# Patient Record
Sex: FEMALE | Race: WHITE
[De-identification: names, ages, dates, MRNs, and addresses within clinical notes are randomized per-mention and may not be internally consistent; named-entity substitution may affect disease eponyms.]

---

## 2020-09-30 PROBLEM — Z00.00 ENCOUNTER FOR PREVENTIVE HEALTH EXAMINATION: Status: ACTIVE | Noted: 2020-09-30

## 2020-10-21 ENCOUNTER — APPOINTMENT (OUTPATIENT)
Dept: SURGERY | Facility: CLINIC | Age: 55
End: 2020-10-21
Payer: COMMERCIAL

## 2020-10-21 VITALS
TEMPERATURE: 97.8 F | WEIGHT: 182 LBS | SYSTOLIC BLOOD PRESSURE: 120 MMHG | DIASTOLIC BLOOD PRESSURE: 80 MMHG | HEIGHT: 62 IN | BODY MASS INDEX: 33.49 KG/M2 | HEART RATE: 93 BPM

## 2020-10-21 DIAGNOSIS — F17.200 NICOTINE DEPENDENCE, UNSPECIFIED, UNCOMPLICATED: ICD-10-CM

## 2020-10-21 DIAGNOSIS — Z80.3 FAMILY HISTORY OF MALIGNANT NEOPLASM OF BREAST: ICD-10-CM

## 2020-10-21 DIAGNOSIS — Z82.3 FAMILY HISTORY OF STROKE: ICD-10-CM

## 2020-10-21 DIAGNOSIS — Z82.49 FAMILY HISTORY OF ISCHEMIC HEART DISEASE AND OTHER DISEASES OF THE CIRCULATORY SYSTEM: ICD-10-CM

## 2020-10-21 PROCEDURE — 99204 OFFICE O/P NEW MOD 45 MIN: CPT

## 2020-10-21 PROCEDURE — 99072 ADDL SUPL MATRL&STAF TM PHE: CPT

## 2020-10-23 PROBLEM — Z82.3 FAMILY HISTORY OF CEREBROVASCULAR ACCIDENT (CVA): Status: ACTIVE | Noted: 2020-10-21

## 2020-10-23 PROBLEM — Z80.3 FAMILY HISTORY OF MALIGNANT NEOPLASM OF BREAST: Status: ACTIVE | Noted: 2020-10-21

## 2020-10-23 PROBLEM — Z82.49 FAMILY HISTORY OF CARDIAC DISORDER: Status: ACTIVE | Noted: 2020-10-21

## 2020-10-23 PROBLEM — F17.200 CURRENT EVERY DAY SMOKER: Status: ACTIVE | Noted: 2020-10-21

## 2020-10-23 NOTE — PHYSICAL EXAM
[Abdomen Masses] : No abdominal masses [Abdomen Tenderness] : ~T No ~M abdominal tenderness [No HSM] : no hepatosplenomegaly [Excoriation] : no perianal excoriation [Fistula] : no fistulas [Wart] : no warts [Ulcer ___ cm] : no ulcers [Pilonidal Cyst] : no pilonidal cysts [Nonprolapsing] : a nonprolapsing (grade I) [Tender, Swollen] : nontender, non-swollen [Thrombosed] : that was not thrombosed [Skin Tags] : there were no residual hemorrhoidal skin tags seen [Normal] : was normal [None] : there was no rectal mass  [Respiratory Effort] : normal respiratory effort [Normal Rate and Rhythm] : normal rate and rhythm [de-identified] : external examination shows no significant abnormalities [de-identified] : awake, alert and in no acute distress

## 2020-10-23 NOTE — HISTORY OF PRESENT ILLNESS
[FreeTextEntry1] : Patient is a 55F with no PMH who presents for evaluation of colon polyps.  Patient states that she believes she has a polyp in her distal rectum.  She denies any symptoms including pain, bleeding, pressure or prolapse.  She is tolerating a diet and having 1-2 soft BM daily.  Patient denies fevers, chills, nausea, vomiting, abdominal pain, constipation, diarrhea, blood in her stool or unexpected weight loss.  Patient has a family history of colon polyps in her father. Patient has never had a colonoscopy.\par

## 2020-10-23 NOTE — ASSESSMENT
[FreeTextEntry1] : 55F for screening colonoscopy\par \par I discussed with the patient that her exam today did not reveal a polyp.  I recommended colonoscopy given her age.  All risks benefits and alternatives were discussed including incomplete colonoscopy, missed lesion, bleeding, infection and perforation requiring laparotomy.  The patient expressed understanding and was in agreement with the plan.\par

## 2020-10-23 NOTE — PROCEDURE
[FreeTextEntry1] : YOHANA and anoscopy show normal sphincter tone, normal internal hemorrhoids and no masses.  No polypoid tissue was identified on exam.\par

## 2020-11-09 ENCOUNTER — LABORATORY RESULT (OUTPATIENT)
Age: 55
End: 2020-11-09

## 2020-11-10 ENCOUNTER — APPOINTMENT (OUTPATIENT)
Dept: OBGYN | Facility: CLINIC | Age: 55
End: 2020-11-10
Payer: COMMERCIAL

## 2020-11-10 VITALS
SYSTOLIC BLOOD PRESSURE: 122 MMHG | TEMPERATURE: 98.2 F | WEIGHT: 185 LBS | DIASTOLIC BLOOD PRESSURE: 80 MMHG | BODY MASS INDEX: 33.84 KG/M2

## 2020-11-10 PROCEDURE — 99386 PREV VISIT NEW AGE 40-64: CPT

## 2020-11-10 PROCEDURE — 99072 ADDL SUPL MATRL&STAF TM PHE: CPT

## 2020-11-10 NOTE — HISTORY OF PRESENT ILLNESS
[FreeTextEntry1] : Patient is 55 years old para 5-0-0-5 last menstrual period 2003\par She denies postmenopausal bleeding\par Patient was noted to have an endometrial polyp 9 x 7 x 3 mm on pelvic ultrasound of January 15, 2020\par Recommendation was for sonohysterogram at that time\par

## 2020-11-10 NOTE — DISCUSSION/SUMMARY
[FreeTextEntry1] : Pap done\par Self breast exam stressed\par Prescribed bilateral screening mammogram\par Prescribed sonohysterogram\par Advised patient that if polyp is identified on sonohysterogram a hysteroscopy D&C with MyoSure is recommended

## 2020-11-12 ENCOUNTER — NON-APPOINTMENT (OUTPATIENT)
Age: 55
End: 2020-11-12

## 2020-12-30 ENCOUNTER — RESULT CHARGE (OUTPATIENT)
Age: 55
End: 2020-12-30

## 2020-12-30 ENCOUNTER — ASOB RESULT (OUTPATIENT)
Age: 55
End: 2020-12-30

## 2020-12-30 ENCOUNTER — OUTPATIENT (OUTPATIENT)
Dept: OUTPATIENT SERVICES | Facility: HOSPITAL | Age: 55
LOS: 1 days | Discharge: HOME | End: 2020-12-30

## 2020-12-30 ENCOUNTER — APPOINTMENT (OUTPATIENT)
Dept: ANTEPARTUM | Facility: CLINIC | Age: 55
End: 2020-12-30
Payer: COMMERCIAL

## 2020-12-30 VITALS
DIASTOLIC BLOOD PRESSURE: 90 MMHG | WEIGHT: 182 LBS | HEIGHT: 62 IN | SYSTOLIC BLOOD PRESSURE: 139 MMHG | BODY MASS INDEX: 33.49 KG/M2

## 2020-12-30 LAB
HCG UR QL: NEGATIVE
QUALITY CONTROL: YES

## 2020-12-30 PROCEDURE — 76831 ECHO EXAM UTERUS: CPT | Mod: 26

## 2020-12-30 PROCEDURE — 58340 CATHETER FOR HYSTEROGRAPHY: CPT

## 2021-04-09 ENCOUNTER — OUTPATIENT (OUTPATIENT)
Dept: OUTPATIENT SERVICES | Facility: HOSPITAL | Age: 56
LOS: 1 days | Discharge: HOME | End: 2021-04-09
Payer: COMMERCIAL

## 2021-04-09 VITALS
WEIGHT: 178.57 LBS | DIASTOLIC BLOOD PRESSURE: 84 MMHG | SYSTOLIC BLOOD PRESSURE: 132 MMHG | HEART RATE: 62 BPM | TEMPERATURE: 97 F | HEIGHT: 62 IN | RESPIRATION RATE: 16 BRPM | OXYGEN SATURATION: 99 %

## 2021-04-09 DIAGNOSIS — N84.0 POLYP OF CORPUS UTERI: ICD-10-CM

## 2021-04-09 DIAGNOSIS — Z01.818 ENCOUNTER FOR OTHER PREPROCEDURAL EXAMINATION: ICD-10-CM

## 2021-04-09 LAB
ALBUMIN SERPL ELPH-MCNC: 4.9 G/DL — SIGNIFICANT CHANGE UP (ref 3.5–5.2)
ALP SERPL-CCNC: 77 U/L — SIGNIFICANT CHANGE UP (ref 30–115)
ALT FLD-CCNC: 9 U/L — SIGNIFICANT CHANGE UP (ref 0–41)
ANION GAP SERPL CALC-SCNC: 11 MMOL/L — SIGNIFICANT CHANGE UP (ref 7–14)
AST SERPL-CCNC: 14 U/L — SIGNIFICANT CHANGE UP (ref 0–41)
BASOPHILS # BLD AUTO: 0.03 K/UL — SIGNIFICANT CHANGE UP (ref 0–0.2)
BASOPHILS NFR BLD AUTO: 0.4 % — SIGNIFICANT CHANGE UP (ref 0–1)
BILIRUB SERPL-MCNC: 0.2 MG/DL — SIGNIFICANT CHANGE UP (ref 0.2–1.2)
BUN SERPL-MCNC: 11 MG/DL — SIGNIFICANT CHANGE UP (ref 10–20)
CALCIUM SERPL-MCNC: 10.4 MG/DL — HIGH (ref 8.5–10.1)
CHLORIDE SERPL-SCNC: 101 MMOL/L — SIGNIFICANT CHANGE UP (ref 98–110)
CO2 SERPL-SCNC: 28 MMOL/L — SIGNIFICANT CHANGE UP (ref 17–32)
CREAT SERPL-MCNC: 0.7 MG/DL — SIGNIFICANT CHANGE UP (ref 0.7–1.5)
EOSINOPHIL # BLD AUTO: 0.15 K/UL — SIGNIFICANT CHANGE UP (ref 0–0.7)
EOSINOPHIL NFR BLD AUTO: 1.8 % — SIGNIFICANT CHANGE UP (ref 0–8)
GLUCOSE SERPL-MCNC: 59 MG/DL — LOW (ref 70–99)
HCT VFR BLD CALC: 44.9 % — SIGNIFICANT CHANGE UP (ref 37–47)
HGB BLD-MCNC: 14.6 G/DL — SIGNIFICANT CHANGE UP (ref 12–16)
IMM GRANULOCYTES NFR BLD AUTO: 0.2 % — SIGNIFICANT CHANGE UP (ref 0.1–0.3)
LYMPHOCYTES # BLD AUTO: 2.66 K/UL — SIGNIFICANT CHANGE UP (ref 1.2–3.4)
LYMPHOCYTES # BLD AUTO: 32 % — SIGNIFICANT CHANGE UP (ref 20.5–51.1)
MCHC RBC-ENTMCNC: 30.2 PG — SIGNIFICANT CHANGE UP (ref 27–31)
MCHC RBC-ENTMCNC: 32.5 G/DL — SIGNIFICANT CHANGE UP (ref 32–37)
MCV RBC AUTO: 92.8 FL — SIGNIFICANT CHANGE UP (ref 81–99)
MONOCYTES # BLD AUTO: 0.5 K/UL — SIGNIFICANT CHANGE UP (ref 0.1–0.6)
MONOCYTES NFR BLD AUTO: 6 % — SIGNIFICANT CHANGE UP (ref 1.7–9.3)
NEUTROPHILS # BLD AUTO: 4.95 K/UL — SIGNIFICANT CHANGE UP (ref 1.4–6.5)
NEUTROPHILS NFR BLD AUTO: 59.6 % — SIGNIFICANT CHANGE UP (ref 42.2–75.2)
NRBC # BLD: 0 /100 WBCS — SIGNIFICANT CHANGE UP (ref 0–0)
PLATELET # BLD AUTO: 359 K/UL — SIGNIFICANT CHANGE UP (ref 130–400)
POTASSIUM SERPL-MCNC: 4.8 MMOL/L — SIGNIFICANT CHANGE UP (ref 3.5–5)
POTASSIUM SERPL-SCNC: 4.8 MMOL/L — SIGNIFICANT CHANGE UP (ref 3.5–5)
PROT SERPL-MCNC: 7.7 G/DL — SIGNIFICANT CHANGE UP (ref 6–8)
RBC # BLD: 4.84 M/UL — SIGNIFICANT CHANGE UP (ref 4.2–5.4)
RBC # FLD: 12.4 % — SIGNIFICANT CHANGE UP (ref 11.5–14.5)
SODIUM SERPL-SCNC: 140 MMOL/L — SIGNIFICANT CHANGE UP (ref 135–146)
WBC # BLD: 8.31 K/UL — SIGNIFICANT CHANGE UP (ref 4.8–10.8)
WBC # FLD AUTO: 8.31 K/UL — SIGNIFICANT CHANGE UP (ref 4.8–10.8)

## 2021-04-09 PROCEDURE — 93010 ELECTROCARDIOGRAM REPORT: CPT

## 2021-04-09 NOTE — H&P PST ADULT - HISTORY OF PRESENT ILLNESS
54 yo female presents with diagnosis endometrial polyp noted on routine sono "i went through early menopause& dr wants it out"; pt is scheduled for hysteroscopy D&C, myosure. pt is asymptomatic.  denies chest pain, palpitations, shortness of breath, dyspnea, or dysuria. exercise tolerance: "8 miles" w/o sob   pt denies any known exposure to COVID-19, denies any S&S  pt instructed re: self quarantine guidelines    Anesthesia Alert  NO--Difficult Airway  NO--History of neck surgery or radiation  NO--Limited ROM of neck  NO--History of Malignant hyperthermia  NO--No personal or family history of Pseudocholinesterase deficiency.  NO--Prior Anesthesia Complication  NO--Latex Allergy  NO--Loose teeth  NO--History of Rheumatoid Arthritis  NO--ANGEL  NO--Other_____

## 2021-04-09 NOTE — H&P PST ADULT - IS PATIENT PREGNANT?
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  Details of initial diagnosis are not available at this time, again requested to be forwarded by mother.  He is overdue for bloodwork which we have ordered at this time.  Dose will be adjusted as necessary.  Once we receive records from hospital at time of diagnosis, if found to have been only mildly hypothyroid there may be room to trial off medication.  Prior outpatient records have been reviewed and thyroid ultrasound was normal.
menopausal

## 2021-04-22 PROBLEM — N84.0 POLYP OF CORPUS UTERI: Chronic | Status: ACTIVE | Noted: 2021-04-09

## 2021-04-27 ENCOUNTER — OUTPATIENT (OUTPATIENT)
Dept: OUTPATIENT SERVICES | Facility: HOSPITAL | Age: 56
LOS: 1 days | Discharge: HOME | End: 2021-04-27

## 2021-04-27 ENCOUNTER — LABORATORY RESULT (OUTPATIENT)
Age: 56
End: 2021-04-27

## 2021-04-27 DIAGNOSIS — Z11.59 ENCOUNTER FOR SCREENING FOR OTHER VIRAL DISEASES: ICD-10-CM

## 2021-04-30 ENCOUNTER — APPOINTMENT (OUTPATIENT)
Dept: OBGYN | Facility: AMBULATORY SURGERY CENTER | Age: 56
End: 2021-04-30

## 2021-04-30 ENCOUNTER — OUTPATIENT (OUTPATIENT)
Dept: OUTPATIENT SERVICES | Facility: HOSPITAL | Age: 56
LOS: 1 days | Discharge: HOME | End: 2021-04-30
Payer: COMMERCIAL

## 2021-04-30 ENCOUNTER — RESULT REVIEW (OUTPATIENT)
Age: 56
End: 2021-04-30

## 2021-04-30 VITALS
SYSTOLIC BLOOD PRESSURE: 129 MMHG | OXYGEN SATURATION: 98 % | DIASTOLIC BLOOD PRESSURE: 76 MMHG | HEART RATE: 72 BPM | RESPIRATION RATE: 18 BRPM

## 2021-04-30 VITALS
HEIGHT: 62 IN | WEIGHT: 178.57 LBS | HEART RATE: 82 BPM | DIASTOLIC BLOOD PRESSURE: 76 MMHG | SYSTOLIC BLOOD PRESSURE: 131 MMHG | OXYGEN SATURATION: 100 % | RESPIRATION RATE: 18 BRPM

## 2021-04-30 PROCEDURE — 88305 TISSUE EXAM BY PATHOLOGIST: CPT | Mod: 26

## 2021-04-30 PROCEDURE — 58558 HYSTEROSCOPY BIOPSY: CPT

## 2021-04-30 RX ORDER — HYDROMORPHONE HYDROCHLORIDE 2 MG/ML
0.5 INJECTION INTRAMUSCULAR; INTRAVENOUS; SUBCUTANEOUS
Refills: 0 | Status: DISCONTINUED | OUTPATIENT
Start: 2021-04-30 | End: 2021-04-30

## 2021-04-30 RX ORDER — ROBINUL 0.2 MG/ML
0.4 INJECTION INTRAMUSCULAR; INTRAVENOUS ONCE
Refills: 0 | Status: COMPLETED | OUTPATIENT
Start: 2021-04-30 | End: 2021-04-30

## 2021-04-30 RX ORDER — SODIUM CHLORIDE 9 MG/ML
1000 INJECTION, SOLUTION INTRAVENOUS
Refills: 0 | Status: DISCONTINUED | OUTPATIENT
Start: 2021-04-30 | End: 2021-05-14

## 2021-04-30 RX ORDER — ONDANSETRON 8 MG/1
4 TABLET, FILM COATED ORAL ONCE
Refills: 0 | Status: DISCONTINUED | OUTPATIENT
Start: 2021-04-30 | End: 2021-05-14

## 2021-04-30 RX ADMIN — SODIUM CHLORIDE 75 MILLILITER(S): 9 INJECTION, SOLUTION INTRAVENOUS at 09:30

## 2021-04-30 RX ADMIN — ROBINUL 0.4 MILLIGRAM(S): 0.2 INJECTION INTRAMUSCULAR; INTRAVENOUS at 10:08

## 2021-04-30 NOTE — ASU DISCHARGE PLAN (ADULT/PEDIATRIC) - CARE PROVIDER_API CALL
Luke Candelario)  Obstetrics and Gynecology  [address] [place] [address]  Phone: [phone]  Fax: [phone]  Established Patient  Follow Up Time: 2 weeks

## 2021-04-30 NOTE — CHART NOTE - NSCHARTNOTEFT_GEN_A_CORE
PACU ANESTHESIA ADMISSION NOTE      Procedure: Polypectomy, uterus, hysteroscopic    Dilation and curettage, uterus      Post op diagnosis:  Uterine polyp        ____  Intubated  TV:______       Rate: ______      FiO2: ______    __x__  Patent Airway    __x__  Full return of protective reflexes    __x__  Full recovery from anesthesia / back to baseline     Vitals:   T:  97.6         R:  18                BP:   140/70               Sat:   99%                P: 77      Mental Status:  __x__ Awake   __x___ Alert   _____ Drowsy   _____ Sedated    Nausea/Vomiting:  __x__ NO  ______Yes,   See Post - Op Orders          Pain Scale (0-10):  __0___    Treatment: ____ None    ___x_ See Post - Op/PCA Orders    Post - Operative Fluids:   ____ Oral   __x__ See Post - Op Orders    Plan: Discharge:   __x__Home       _____Floor     _____Critical Care    _____  Other:_________________    Comments:  pt tolerated procedure well, pt transferred to PACU and report was given to PACU RN, vital signs are stable and pt shows no signs of distress. no anesthesia complications, discharge pt when criteria met.

## 2021-04-30 NOTE — BRIEF OPERATIVE NOTE - NSICDXBRIEFPROCEDURE_GEN_ALL_CORE_FT
PROCEDURES:  Polypectomy, uterus, hysteroscopic 30-Apr-2021 09:23:11  Socorro Alejandro  Dilation and curettage, uterus 30-Apr-2021 09:23:21  Socorro Alejandro

## 2021-04-30 NOTE — BRIEF OPERATIVE NOTE - OPERATION/FINDINGS
anteverted, non-enlarged, mobile uterus; no adnexal masses  hysteroscopy: 9mm polyp near the right fallopian tube ostia, bilateral ostia visualized, remaining uterine cavity appeared atrophic in appearance anteverted, non-enlarged, mobile uterus; no adnexal masses  hysteroscopy: 9mm polyp fundal 1 o'clock, bilateral ostia visualized, remaining uterine cavity appeared atrophic in appearance. Curettage was productive of a scant amount of tissue.

## 2021-05-03 LAB — SURGICAL PATHOLOGY STUDY: SIGNIFICANT CHANGE UP

## 2021-05-06 ENCOUNTER — NON-APPOINTMENT (OUTPATIENT)
Age: 56
End: 2021-05-06

## 2021-05-06 DIAGNOSIS — N72 INFLAMMATORY DISEASE OF CERVIX UTERI: ICD-10-CM

## 2021-05-06 RX ORDER — DOXYCYCLINE HYCLATE 100 MG/1
100 TABLET ORAL
Qty: 14 | Refills: 0 | Status: COMPLETED | COMMUNITY
Start: 2021-05-06 | End: 2021-05-13

## 2021-05-08 DIAGNOSIS — N93.9 ABNORMAL UTERINE AND VAGINAL BLEEDING, UNSPECIFIED: ICD-10-CM

## 2021-05-08 DIAGNOSIS — F17.210 NICOTINE DEPENDENCE, CIGARETTES, UNCOMPLICATED: ICD-10-CM

## 2021-05-08 DIAGNOSIS — N84.0 POLYP OF CORPUS UTERI: ICD-10-CM

## 2021-05-08 DIAGNOSIS — Z88.8 ALLERGY STATUS TO OTHER DRUGS, MEDICAMENTS AND BIOLOGICAL SUBSTANCES: ICD-10-CM

## 2021-05-08 DIAGNOSIS — N72 INFLAMMATORY DISEASE OF CERVIX UTERI: ICD-10-CM

## 2021-10-05 ENCOUNTER — TRANSCRIPTION ENCOUNTER (OUTPATIENT)
Age: 56
End: 2021-10-05

## 2022-03-03 NOTE — REASON FOR VISIT
The following findings require follow up:  Radiographic finding   Finding: MRI brain: Severe left sphenoid sinus disease with inspissated material   Follow up required: ENT and family medicine   Follow up should be done within 1 week(s) by family medicine  And 2-4 weeks for ENT    Please notify the following clinician to assist with the follow up:   Dr Bradley Mcgrath ENT and Dr Mati Viera [Initial] : an initial consultation for

## 2022-09-28 ENCOUNTER — LABORATORY RESULT (OUTPATIENT)
Age: 57
End: 2022-09-28

## 2022-09-29 ENCOUNTER — APPOINTMENT (OUTPATIENT)
Dept: OBGYN | Facility: CLINIC | Age: 57
End: 2022-09-29

## 2022-09-29 VITALS — WEIGHT: 164 LBS | BODY MASS INDEX: 30 KG/M2 | SYSTOLIC BLOOD PRESSURE: 114 MMHG | DIASTOLIC BLOOD PRESSURE: 62 MMHG

## 2022-09-29 DIAGNOSIS — Z01.419 ENCOUNTER FOR GYNECOLOGICAL EXAMINATION (GENERAL) (ROUTINE) W/OUT ABNORMAL FINDINGS: ICD-10-CM

## 2022-09-29 PROCEDURE — 99396 PREV VISIT EST AGE 40-64: CPT

## 2022-09-29 NOTE — HISTORY OF PRESENT ILLNESS
[FreeTextEntry1] : Patient is 57 years old para 5-0-0-5 last menstrual period 2003.\par She denies postmenopausal bleeding and is presently without complaints.

## 2022-11-15 ENCOUNTER — APPOINTMENT (OUTPATIENT)
Dept: SURGERY | Facility: CLINIC | Age: 57
End: 2022-11-15

## 2022-11-15 VITALS
DIASTOLIC BLOOD PRESSURE: 80 MMHG | TEMPERATURE: 97.5 F | HEART RATE: 100 BPM | BODY MASS INDEX: 28.52 KG/M2 | SYSTOLIC BLOOD PRESSURE: 140 MMHG | WEIGHT: 155 LBS | HEIGHT: 62 IN | OXYGEN SATURATION: 97 %

## 2022-11-15 DIAGNOSIS — Z12.11 ENCOUNTER FOR SCREENING FOR MALIGNANT NEOPLASM OF COLON: ICD-10-CM

## 2022-11-15 PROCEDURE — 99214 OFFICE O/P EST MOD 30 MIN: CPT

## 2022-11-16 PROBLEM — Z12.11 ENCOUNTER FOR SCREENING COLONOSCOPY: Status: ACTIVE | Noted: 2020-10-23

## 2022-11-16 NOTE — PHYSICAL EXAM
[Abdomen Masses] : No abdominal masses [Abdomen Tenderness] : ~T No ~M abdominal tenderness [No HSM] : no hepatosplenomegaly [Excoriation] : no perianal excoriation [Fistula] : no fistulas [Wart] : no warts [Ulcer ___ cm] : no ulcers [Pilonidal Cyst] : no pilonidal cysts [Nonprolapsing] : a nonprolapsing (grade I) [Tender, Swollen] : nontender, non-swollen [Thrombosed] : that was not thrombosed [Skin Tags] : there were no residual hemorrhoidal skin tags seen [Normal] : was normal [None] : there was no rectal mass  [Respiratory Effort] : normal respiratory effort [Normal Rate and Rhythm] : normal rate and rhythm [de-identified] : external examination shows no significant abnormalities [de-identified] : awake, alert and in no acute distress

## 2022-11-16 NOTE — HISTORY OF PRESENT ILLNESS
[FreeTextEntry1] : Patient is a 57F with no PMH who presents to discuss screening colonoscopy  Patient states that she believes she has a polyp in her distal rectum.  She denies any symptoms including pain, bleeding, pressure or prolapse.  She is tolerating a diet and having 1-2 soft BM daily.  Patient denies fevers, chills, nausea, vomiting, abdominal pain, constipation, diarrhea, blood in her stool or unexpected weight loss.  Patient has a family history of colon polyps in her father. Patient has never had a colonoscopy.\par

## 2022-11-16 NOTE — ASSESSMENT
[FreeTextEntry1] : 57F for screening colonoscopy\par \par Given the patient's age and this concern for a polyp, I recommended colonoscopy. All risks benefits and alternatives were discussed including incomplete colonoscopy, missed lesion, bleeding, infection and perforation requiring laparotomy. The patient expressed understanding and was in agreement with the plan.

## 2022-11-17 ENCOUNTER — NON-APPOINTMENT (OUTPATIENT)
Age: 57
End: 2022-11-17

## 2022-11-29 ENCOUNTER — APPOINTMENT (OUTPATIENT)
Dept: BREAST CENTER | Facility: CLINIC | Age: 57
End: 2022-11-29

## 2022-11-29 VITALS
BODY MASS INDEX: 28.52 KG/M2 | DIASTOLIC BLOOD PRESSURE: 62 MMHG | WEIGHT: 155 LBS | SYSTOLIC BLOOD PRESSURE: 142 MMHG | HEIGHT: 62 IN

## 2022-11-29 PROCEDURE — 99203 OFFICE O/P NEW LOW 30 MIN: CPT

## 2022-11-29 NOTE — ASSESSMENT
[FreeTextEntry1] : Patient is a 57F who presents with BIRADS 3 imaging.\par \par Her mother had breast cancer at 30. She also states she has multiple other cancers in her family but not sure which ones. She was offered genetic counseling but does not want it.\par \par She underwent bilateral screening mmg on 10/2022 which showed a right breast nodule (BIRADS 0). She subsequently underwent a right MMG/US which showed the right breast nodule on mmg. Finding was not seen on US (BIRADS 3). She was recommended a repeat mammogram in 6 months.\par \par I had a lengthy discussion with this patient regarding diagnostic results, impressions, recommendations, risks and benefits.\par \par I explained to the patient the BIRADS system of grading and that her findings fall into category 3. Category 3 carries a less than 5% risk of malignancy. Given her unremarkable PE, she can choose to observe the lesion and obtain a MMG as a 6 month follow up. She is also aware that she can also choose to biopsy the lesion if she wishes to. I briefly discussed the procedure will be performed by a breast imaging specialist, and will include numbing with local anesthesia, followed by a small biopsy marker placement afterwards, which is usually not bothersome, and is not recognized by radiofrequency devices.\par \par She understands her options and wants to proceed with observation at this time.\par \par We also discussed obtaining an MRI. The use of MRIs have not been shown to prolong survival, however out of 1000 women screened, an additional 14-15 cancers will be identified. The use of MRIs, has, however, been shown to increase the number of procedures and additional imaging because although it is a very sensitive test, it is not as specific. In addition, the gadolinium dye used in MRIs has later been noted in the brain, although its effects are not currently known. Patient would like to take some time to decide whether she wants to do the MRI or not.\par \par Patient for right MMG in 5/2023.\par \par Patient to follow up after imaging, pending any interval changes.\par \par Patient knows to call if she decides to proceed with a biopsy or with MRI.\par \par Total time spent on encounter was greater than 30 minutes, which included face to face time with the patient, performing an exam, reviewing previous medical records including imaging/ pathology, documenting in patient record and coordinating care/imaging. Greater than 50% of the encounter was spent on counseling and coordination of her breast issue.

## 2022-11-29 NOTE — PHYSICAL EXAM
[Normocephalic] : normocephalic [EOMI] : extra ocular movement intact [No Supraclavicular Adenopathy] : no supraclavicular adenopathy [No Cervical Adenopathy] : no cervical adenopathy [Examined in the supine and seated position] : examined in the supine and seated position [No dominant masses] : no dominant masses in right breast  [No dominant masses] : no dominant masses left breast [No Nipple Retraction] : no left nipple retraction [No Nipple Discharge] : no left nipple discharge [Breast Mass Right Breast ___cm] : no masses [Breast Mass Left Breast ___cm] : no masses [No Axillary Lymphadenopathy] : no left axillary lymphadenopathy [Soft] : abdomen soft [No Rashes] : no rashes [No Ulceration] : no ulceration [Breast Nipple Inversion] : nipples not inverted [Breast Nipple Retraction] : nipples not retracted [Breast Nipple Flattening] : nipples not flattened [Breast Nipple Fissures] : nipples not fissured [de-identified] : NL respirations

## 2022-11-29 NOTE — PAST MEDICAL HISTORY
[Postmenopausal] : The patient is postmenopausal [Menarche Age ____] : age at menarche was [unfilled] [Total Preg ___] : G[unfilled] [Live Births ___] : P[unfilled]  [Age At Live Birth ___] : Age at live birth: [unfilled] [FreeTextEntry3] : 2003 [FreeTextEntry6] : Denies [FreeTextEntry7] : 6 months [FreeTextEntry8] : 2 months

## 2022-11-29 NOTE — HISTORY OF PRESENT ILLNESS
[FreeTextEntry1] : Patient is a 57F with BIRADS 3 imaging\par \par FHx: Denies\par \par \par Her imaging is as follows:\par 3/1/21: B scg MMG --> BIRADS 2\par Scattered density\par \par 10/11/22: B scg MMG --> BIRADS 0\par Scattered density\par Right breast nodule\par \par 11/9/22: R Dx MMG and R US --> BIRADS 3\par Scattered density\par Nodule in right breast. Finding not seen on US\par Nodule probably benign\par Recommend R mmg in 6 months

## 2022-12-01 ENCOUNTER — NON-APPOINTMENT (OUTPATIENT)
Age: 57
End: 2022-12-01

## 2022-12-09 ENCOUNTER — LABORATORY RESULT (OUTPATIENT)
Age: 57
End: 2022-12-09

## 2022-12-12 ENCOUNTER — APPOINTMENT (OUTPATIENT)
Dept: SURGERY | Facility: HOSPITAL | Age: 57
End: 2022-12-12

## 2022-12-12 ENCOUNTER — TRANSCRIPTION ENCOUNTER (OUTPATIENT)
Age: 57
End: 2022-12-12

## 2022-12-12 ENCOUNTER — OUTPATIENT (OUTPATIENT)
Dept: OUTPATIENT SERVICES | Facility: HOSPITAL | Age: 57
LOS: 1 days | Discharge: HOME | End: 2022-12-12

## 2022-12-12 ENCOUNTER — RESULT REVIEW (OUTPATIENT)
Age: 57
End: 2022-12-12

## 2022-12-12 VITALS
RESPIRATION RATE: 18 BRPM | HEART RATE: 79 BPM | DIASTOLIC BLOOD PRESSURE: 63 MMHG | SYSTOLIC BLOOD PRESSURE: 136 MMHG | OXYGEN SATURATION: 99 %

## 2022-12-12 VITALS — HEIGHT: 62 IN | WEIGHT: 149.91 LBS

## 2022-12-12 PROCEDURE — 45385 COLONOSCOPY W/LESION REMOVAL: CPT

## 2022-12-12 PROCEDURE — 88305 TISSUE EXAM BY PATHOLOGIST: CPT | Mod: 26

## 2022-12-12 NOTE — ASU PATIENT PROFILE, ADULT - FALL HARM RISK - UNIVERSAL INTERVENTIONS
Bed in lowest position, wheels locked, appropriate side rails in place/Call bell, personal items and telephone in reach/Instruct patient to call for assistance before getting out of bed or chair/Non-slip footwear when patient is out of bed/[place] to call system/Physically safe environment - no spills, clutter or unnecessary equipment/Purposeful Proactive Rounding/Room/bathroom lighting operational, light cord in reach

## 2022-12-12 NOTE — ASU DISCHARGE PLAN (ADULT/PEDIATRIC) - NS MD DC FALL RISK RISK
For information on Fall & Injury Prevention, visit: [email or web address] OR  [email or web address] OR  https://www.cdc.gov/steadi/patient.html

## 2022-12-12 NOTE — CHART NOTE - NSCHARTNOTEFT_GEN_A_CORE
PACU ANESTHESIA ADMISSION NOTE      Procedure:   Post op diagnosis:      ____  Intubated  TV:______       Rate: ______      FiO2: ______    _x___  Patent Airway    _x___  Full return of protective reflexes    _x___  Full recovery from anesthesia / back to baseline     Vitals:   T:  37         R:    20              BP:  115/60                Sat:   100                P: 84      Mental Status:  __x__ Awake   __x___ Alert   _____ Drowsy   _____ Sedated    Nausea/Vomiting:  _x___ NO  ______Yes,   See Post - Op Orders          Pain Scale (0-10):  _0____    Treatment: ____ None    ____ See Post - Op/PCA Orders    Post - Operative Fluids:   _x___ Oral   ____ See Post - Op Orders    Plan: Discharge:   x____Home       _____Floor     _____Critical Care    _____  Other:_________________    Comments:

## 2022-12-14 LAB — SURGICAL PATHOLOGY STUDY: SIGNIFICANT CHANGE UP

## 2022-12-15 DIAGNOSIS — Z80.0 FAMILY HISTORY OF MALIGNANT NEOPLASM OF DIGESTIVE ORGANS: ICD-10-CM

## 2022-12-15 DIAGNOSIS — Z12.11 ENCOUNTER FOR SCREENING FOR MALIGNANT NEOPLASM OF COLON: ICD-10-CM

## 2022-12-15 DIAGNOSIS — D12.5 BENIGN NEOPLASM OF SIGMOID COLON: ICD-10-CM

## 2022-12-15 DIAGNOSIS — K57.30 DIVERTICULOSIS OF LARGE INTESTINE WITHOUT PERFORATION OR ABSCESS WITHOUT BLEEDING: ICD-10-CM

## 2023-06-08 ENCOUNTER — NON-APPOINTMENT (OUTPATIENT)
Age: 58
End: 2023-06-08

## 2023-06-28 NOTE — ASU PATIENT PROFILE, ADULT - ABILITY TO HEAR (WITH HEARING AID OR HEARING APPLIANCE IF NORMALLY USED):
Another way I would make my environment safe would be to stay away from certain people, places and things.
Adequate: hears normal conversation without difficulty

## 2023-11-09 ENCOUNTER — APPOINTMENT (OUTPATIENT)
Dept: BREAST CENTER | Facility: CLINIC | Age: 58
End: 2023-11-09
Payer: COMMERCIAL

## 2023-11-09 VITALS
HEART RATE: 96 BPM | BODY MASS INDEX: 27.05 KG/M2 | WEIGHT: 147 LBS | HEIGHT: 62 IN | SYSTOLIC BLOOD PRESSURE: 148 MMHG | DIASTOLIC BLOOD PRESSURE: 76 MMHG

## 2023-11-09 DIAGNOSIS — R92.8 OTHER ABNORMAL AND INCONCLUSIVE FINDINGS ON DIAGNOSTIC IMAGING OF BREAST: ICD-10-CM

## 2023-11-09 DIAGNOSIS — N63.20 UNSPECIFIED LUMP IN THE LEFT BREAST, UNSPECIFIED QUADRANT: ICD-10-CM

## 2023-11-09 PROCEDURE — 99214 OFFICE O/P EST MOD 30 MIN: CPT

## 2024-01-15 NOTE — ASU PATIENT PROFILE, ADULT - SURGICAL SITE INCISION
293 nmol - checked Nov 2023  Lipoprotein(a) (Lp[a]) is an independent risk factor for atherosclerotic cardiovascular disease (CVD) and calcific valvular aortic stenosis.  Lp(a) levels typically do not change after 5 years of age except during times of significant inflammation, liver disease, or kidney disease; hence, levels should be interpreted cautiously during these times.  Ezetimibe reduces Lp(a) levels by 7.6% according to the findings of one meta-analysis;13 however, other studies' findings revealed no change.Bile acid sequestrants and fibrates do not have a significant correlation with Lp(a) levels; some studies' findings show an increase and others' show no effect. Niacin decreases Lp(a) levels by 23%; however, it is not recommended for use because it lacks mortality and morbidity benefit in patients at risk of cardiovascular disease (CVD). Additionally, its adverse effect profile limits use.  alirocumab indicate an approximate 23% reduction in Lp(a) levels,11 and from the FOURIER (Further Cardiovascular Outcomes Research With PCSK9 Inhibition in Subjects With Elevated Risk) trial of evolocumab indicate an approximate 27% reduction.12 Statins slightly increase Lp(a) levels, or levels remain stable with therapy  With inclisiran there is also a 27% decrease in LP(a) levels.  Patient has opted to go for inclisiran infusion we will start the process for the patient.  Remain on statin therapy 20 mg daily  Continue aspirin 81 mg daily no utility of doing a calcium score at this point we will do a stress test in the future due to patient's family history of heart disease.   no

## 2024-01-16 ENCOUNTER — LABORATORY RESULT (OUTPATIENT)
Age: 59
End: 2024-01-16

## 2024-01-17 ENCOUNTER — APPOINTMENT (OUTPATIENT)
Dept: OBGYN | Facility: CLINIC | Age: 59
End: 2024-01-17
Payer: COMMERCIAL

## 2024-01-17 VITALS — HEIGHT: 62 IN | BODY MASS INDEX: 31.28 KG/M2 | WEIGHT: 170 LBS

## 2024-01-17 DIAGNOSIS — Z01.419 ENCOUNTER FOR GYNECOLOGICAL EXAMINATION (GENERAL) (ROUTINE) W/OUT ABNORMAL FINDINGS: ICD-10-CM

## 2024-01-17 PROCEDURE — 99396 PREV VISIT EST AGE 40-64: CPT

## 2024-01-17 NOTE — DISCUSSION/SUMMARY
[FreeTextEntry1] : Pap done Self breast exam stressed Prescribed left breast diagnostic mammogram and left breast ultrasound Follow-up with breast specialist as scheduled Follow-up yearly or as needed

## 2024-01-17 NOTE — PHYSICAL EXAM
[Chaperone Present] : A chaperone was present in the examining room during all aspects of the physical examination [FreeTextEntry1] : Janell [Appropriately responsive] : appropriately responsive [Alert] : alert [No Acute Distress] : no acute distress [No Lymphadenopathy] : no lymphadenopathy [Regular Rate Rhythm] : regular rate rhythm [No Murmurs] : no murmurs [Clear to Auscultation B/L] : clear to auscultation bilaterally [Soft] : soft [Non-tender] : non-tender [Non-distended] : non-distended [No HSM] : No HSM [No Lesions] : no lesions [No Mass] : no mass [Oriented x3] : oriented x3 [Examination Of The Breasts] : a normal appearance [No Masses] : no breast masses were palpable [Labia Majora] : normal [Labia Minora] : normal [Normal] : normal [Uterine Adnexae] : normal

## 2024-01-17 NOTE — HISTORY OF PRESENT ILLNESS
[FreeTextEntry1] : Patient is 58 years old para 5-0-0-5 last menstrual period 2003 She denies postmenopausal bleeding and is present without complaints

## 2024-01-31 ENCOUNTER — TRANSCRIPTION ENCOUNTER (OUTPATIENT)
Age: 59
End: 2024-01-31

## 2024-02-17 ENCOUNTER — OUTPATIENT (OUTPATIENT)
Dept: OUTPATIENT SERVICES | Facility: HOSPITAL | Age: 59
LOS: 1 days | End: 2024-02-17
Payer: COMMERCIAL

## 2024-02-17 DIAGNOSIS — Z12.31 ENCOUNTER FOR SCREENING MAMMOGRAM FOR MALIGNANT NEOPLASM OF BREAST: ICD-10-CM

## 2024-02-17 PROCEDURE — 77080 DXA BONE DENSITY AXIAL: CPT | Mod: 26

## 2024-02-17 PROCEDURE — 77080 DXA BONE DENSITY AXIAL: CPT

## 2024-02-18 DIAGNOSIS — Z12.31 ENCOUNTER FOR SCREENING MAMMOGRAM FOR MALIGNANT NEOPLASM OF BREAST: ICD-10-CM

## 2024-02-25 ENCOUNTER — OUTPATIENT (OUTPATIENT)
Dept: OUTPATIENT SERVICES | Facility: HOSPITAL | Age: 59
LOS: 1 days | End: 2024-02-25
Payer: COMMERCIAL

## 2024-02-25 DIAGNOSIS — Z00.8 ENCOUNTER FOR OTHER GENERAL EXAMINATION: ICD-10-CM

## 2024-02-25 DIAGNOSIS — F17.200 NICOTINE DEPENDENCE, UNSPECIFIED, UNCOMPLICATED: ICD-10-CM

## 2024-02-25 PROCEDURE — 71271 CT THORAX LUNG CANCER SCR C-: CPT

## 2024-02-25 PROCEDURE — 71271 CT THORAX LUNG CANCER SCR C-: CPT | Mod: 26

## 2024-02-26 DIAGNOSIS — F17.200 NICOTINE DEPENDENCE, UNSPECIFIED, UNCOMPLICATED: ICD-10-CM

## 2024-04-16 ENCOUNTER — OUTPATIENT (OUTPATIENT)
Dept: OUTPATIENT SERVICES | Facility: HOSPITAL | Age: 59
LOS: 1 days | End: 2024-04-16

## 2024-04-16 DIAGNOSIS — Z00.8 ENCOUNTER FOR OTHER GENERAL EXAMINATION: ICD-10-CM

## 2024-04-17 DIAGNOSIS — Z00.8 ENCOUNTER FOR OTHER GENERAL EXAMINATION: ICD-10-CM

## 2024-05-02 ENCOUNTER — RESULT REVIEW (OUTPATIENT)
Age: 59
End: 2024-05-02

## 2024-05-02 ENCOUNTER — OUTPATIENT (OUTPATIENT)
Dept: OUTPATIENT SERVICES | Facility: HOSPITAL | Age: 59
LOS: 1 days | End: 2024-05-02
Payer: COMMERCIAL

## 2024-05-02 ENCOUNTER — APPOINTMENT (OUTPATIENT)
Age: 59
End: 2024-05-02

## 2024-05-02 DIAGNOSIS — R92.8 OTHER ABNORMAL AND INCONCLUSIVE FINDINGS ON DIAGNOSTIC IMAGING OF BREAST: ICD-10-CM

## 2024-05-02 PROCEDURE — 76642 ULTRASOUND BREAST LIMITED: CPT | Mod: 26,LT

## 2024-05-02 PROCEDURE — 76642 ULTRASOUND BREAST LIMITED: CPT | Mod: LT

## 2024-05-03 DIAGNOSIS — R92.8 OTHER ABNORMAL AND INCONCLUSIVE FINDINGS ON DIAGNOSTIC IMAGING OF BREAST: ICD-10-CM

## 2024-05-07 NOTE — ASU DISCHARGE PLAN (ADULT/PEDIATRIC) - DISCHARGE PLAN IS COMPLETE AND GIVEN TO PATIENT
24  RE: Unique Kebede  : 1995  GREGORY: 2024  Gest age: 34w1d   : No        Dear Dr. Shemar Dave MD,    We had the pleasure of meeting Unique Kebede for a f/u MFM US/consultation. As you know, Unique Kebede is a 28 year old  at 32w1d IUP. We spent the majority of her visit discussing pregnancy management of the following issues:    MATERNAL:   Anemia  Epilepsy  Chronic hypertension  Obesity  Bipolar  Migraines     FETAL:   None      Patient reports: +fetal movement. No vaginal bleeding, vaginal discharge, loss of fluid, rupture of membranes, ha/vision changes/ruq pain, dysuria, uterine contractions, chest pain,shortness of breath.     She is s/p seizure episode this week. It appears this was witnessed by her brother who prevented her from trauma.  She reports compliance with Keppra and stated levels were nl 2 weeks ago.  Please note she was entered under new MRN [id number] for recent sx epidose. It appears she was seen in L&D and reassured. No Keppra levels.     Reports home bp's wnl - no bp log avail for review    Review of Systems:  Constitutional: Denies fatigue, excessive weakness, fever, or chills  HEENT: Denies sore throat, visual changes, or sinus drainage  Cardiovascular: Denies chest pain or palpitations  Respiratory: Denies shortness of breath, cough, or wheezing  Breast: Denies pain, mass or nipple discharge  Gastrointestinal: Denies nausea, vomiting, diarrhea, or constipation  Female genitourinary: Denies abnormal vaginal discharge, irritation, burning, or malodor. Denies dysuria, frequency, or urgency  Musculoskeletal: Denies joint pain or swelling  Integumentary: Denies rash, erythema, or irritation  Neurologic: Denies headache or dizziness  Psychiatric: Denies depression or suicidal or homicidal ideations  ROS is otherwise negative.         PAST OBSTETRICAL HISTORY:    2015 - female, ft , epilepsy/chtn (keppra/lamictal), IOL at 37wks bc of  worsening epilepsy, 5.11lbs w/o nicu admission   - sab at 12wks, no d&C   - sab at 15wks, , path - male fetus, placenta with mild/acute chorio  Current preg w/o IVF    PAST MEDICAL HISTORY: Epilepsy and CHTN - per pt, both were diagnosed at age 13yrs  Epilepsy: pt reports grand mal seizures and absence seizures, managed by neurology and aware of preg, reports last sz 6months ago, reports compliance with keppra  chtn - reports stable w/o antihypertensive  Anemia, obesity - bmi 38, migraines, bipolar was with psych last year - currently doing well w/o meds    MEDICATIONS: pnv's, Keppra 750mg tabs: 2 tabs am and 3tabs in pm      Visit Vitals  /79 (BP Location: LUE - Left upper extremity, Patient Position: Sitting, Cuff Size: Large Adult)   Pulse 85   LMP 2023          EXAM:    General: No acute distress, a&ox3  HEENT: Within normal limits  Lungs: Nonlabored respirations  Skin: No rashes, skin warm and dry, no erythematous areas  Abdomen: Soft, gravid, no g/r, nontender, no fundal tenderness    Labs: see emr/pnr  23 - UDS +MJ, hgb 10.1, plt 341, O+, abs neg, placental path - mild/acute choro, male fetus ~14/15wks, hgb 9.6, plt 273, LAC neg, MCKAY neg, [id number] neg  23 - hgb 10.2, plt 471 high, bun/cr 10.0.4, ast/alt 25/11, levetiracetam level 40.4 wnl  10/16/23 - AT3 121 nl, Prot C 120 nl, Prot S nl, FVL neg, PGM neg,   10/24/23 - hgb 10.2, plt 346, bun/cr 9/0.57, ast/alt 17/18, O+  11/15/23 - CF neg, SMA neg, NIPT LR, Tsh 0.604, hgba1c 5.7%, o+, abs neg, hgb 10.5, plt 347  12 early 1hr 112  24 - levetiracetam level 43 high, afp1 neg,   24 - hgb 11.4, plt 298, bun/cr 7/0.43, ast/alt 24 - hgb electrophoresis - normal hgb present, hgb 10.1, plt 312, ur pcr 138  24 - mat EKG - NSR  3/12/24 - UDS neg, FFN neg,   3/23/24 - hgb 11.0 low, plt 318, 1hr 133  24 - 3hr gtt 84/162/114/120 (0 abnl = NO GDM)      US: please see separate report  Findings:  - live  IUP  - vertex  -efw 41centile last growth sono  - normal amniotic fluid  - anterior placenta  - bpp     Impression: 28 year old  at 34w1d UP with Anemia, Epilepsy, Chronic hypertension, Obesity, Bipolar, Migraines  - fm/ptl/preeclamptic precautions provided to pt    - Anemia:  Reviewed risks, enc optimization of mat blood counts antenatally, enc inc po Fe, repeat cbc in 4wks and if hgb <10 would start IV Fe  Hgb low but improved - cont po Fe    - Epilepsy, on keppra, managed by Neurology:  S/P recent breathrough sz. Further evaluation warranted given she reports compliance and adequate sleep.   Reviewed risks, cont comanagement with Neuro - cont keppra per their management (check levels and adjust med accordingly)    - Chronic hypertension, without antihypertensive:  Reviewed mat/fetal/nn risks of worsening htn vs superimposed preeclampsia  Cont home bp monitoring bid and prn - notify with elevated bp's/pih sx's  Reviewed USPSTF recs re: LDA - enc compliance   Reports home bp's wnl (<140/90) - no bp log avail for review    - Obesity, bmi 38:  Reviewed risks, enc healthy nutrition, exercise as tolerated, reviewed total goal wt gain 11-20lbs in preg   No GDM (3hr gtt wnl)    - Bipolar, stable w/o med tx, currently not managed by Psychiatry:  Reviewed risks, stable at this time w/o med, monitor pt's mood closely, low threshold to consult psych/start med tx as needed    - Migraines:  Reviewed risks, stable at this time      Recommendations:  Please repeat Keppra level and schedule followup with Neuro soon.   Advised not drive for now nor be alone.   - monitor for worsening chtn vs superimposed preeclampsia  - cont LDA until delivery   - enc optimization of mat blood counts antenatally, enc inc po Fe, repeat cbc in 4wks and if hgb <10 would start IV Fe  - home bp monitoring bid and prn, reviewed goal bp's <140/90, call P.OB ASAP with bp's >160/105 or pih symptoms  - monitor migraines - consult neuro as  needed  - enc healthy nutrition, exercise as tolerated, reviewed total goal wt gain 11-20lbs   - P.OB to assess for TRAE, with concern consult pulm/sleep med  - continue comanagement with neuro - neuro to check keppra levels monthly and adjust as needed  - folic acid supplementation  - check baseline mat echocardiogram (CHTN, longstanding)  - continue PO Fe  - monitor pt's mood closely antepartum and postpartum periods, low threshold to consult psych/start med tx as needed  - repeat fetal growth US scheduled in 4wk, serial thereafter  - weekly bpp scheduled until delivery   - weekly nst (ideally 3-4 days separate from MFM BPP) until delivery - P.Ob to schedule  - ACOG recs delivery for isolated chtn w/o antihypertensive at 38.0-39.6wks, sooner if obstetrically indicated  - At delivery, neonates should receive Vitamin K 1mg IM at birth   - with concern for TRAE, rec continuous pulse oximetry monitoring for 24 hours postpartum if patient receives narcotics for pain control  - after delivery enc diet, exercise, wt loss, behavior modification to reduce bmi      All questions answered for pt - pt expresses understanding and is in agreement with plan.    Thank you for allowing me to partake in this patient's care.   Please do not hesitate to contact me if I can be of further assistance.     Sincerely,     Jeremy Mario MD      Note to patient: The 21st Century Cures Act makes medical notes like these available to patients in the interest of transparency. However, be advised this is a medical document. It is intended as peer to peer communication. It is written in medical language and may contain abbreviations or verbiage that are unfamiliar. It may appear blunt or direct. Medical documents are intended to carry relevant information, facts as evident, and the clinical opinion of the practitioner.   : Yes

## 2024-09-04 ENCOUNTER — APPOINTMENT (OUTPATIENT)
Dept: SURGERY | Facility: CLINIC | Age: 59
End: 2024-09-04

## 2024-09-04 VITALS — BODY MASS INDEX: 30.91 KG/M2 | WEIGHT: 168 LBS | HEIGHT: 62 IN

## 2024-09-04 DIAGNOSIS — R10.9 UNSPECIFIED ABDOMINAL PAIN: ICD-10-CM

## 2024-09-04 PROCEDURE — 99213 OFFICE O/P EST LOW 20 MIN: CPT

## 2024-09-04 NOTE — HISTORY OF PRESENT ILLNESS
[de-identified] : This is a pleasant 59-year-old female who thinks that she has an internal hemorrhoid and has waxing and waning abdominal discomfort and intermittent constipation.  She reports occasional blood on the toilet paper once every several months.  She has harder stools and sometimes will take about 1 teaspoon of MiraLAX.  She feels that she drinks enough water and gets a decent amount of fiber in her diet.  She has abdominal discomfort that is typically relieved with bowel movements and she feels uncomfortable when she has harder stools.  She occasionally has pushing to evacuate the stool.  She does not really feel a perianal lump.  She does not have severe pain with defecation.  She was referred by primary care for hemorrhoid which she thinks is a problem.  We had an extensive conversation about the natural history anatomy and functional problems related to hemorrhoidal disease and constipation.  She reports that she had a recent colonoscopy which did not demonstrate any findings.  She was in to see the primary care who referred her for surgical evaluation.

## 2024-09-04 NOTE — CONSULT LETTER
[Dear  ___] : Dear  [unfilled], [Consult Letter:] : I had the pleasure of evaluating your patient, [unfilled]. [Please see my note below.] : Please see my note below. [Consult Closing:] : Thank you very much for allowing me to participate in the care of this patient.  If you have any questions, please do not hesitate to contact me. [Sincerely,] : Sincerely, [FreeTextEntry3] : Bernardo Hernandez MD, FACS [DrKvng  ___] : Dr. LOW

## 2024-09-04 NOTE — PLAN
[FreeTextEntry1] : It sounds like she has irritable bowel type symptoms and may have IBS-C.  I had a discussion with her about typical treatment with fiber and water and exercise.  She and I discussed that at length.  I recommended that she try MiraLAX and increasing the dose until she achieves an effect of that.  I recommended to remove all of the reading material and phones from her bathroom and to only use the bathroom when it is time for defecation.  We discussed the role for new her medications for IBS treatment and the role of gastroenterology and primary care and the overall treatment of what seems like her problem.  She may have an indeterminant or mixed type as well.  All her questions were answered.  There is no surgical indication at this time.  I recommended and counseled her to follow back up with me if she experiences more rectal bleeding.  Thank you very much for allowing me to assist in the care of this nice patient

## 2024-10-28 ENCOUNTER — APPOINTMENT (OUTPATIENT)
Dept: NEUROLOGY | Facility: CLINIC | Age: 59
End: 2024-10-28
Payer: COMMERCIAL

## 2024-10-28 VITALS
DIASTOLIC BLOOD PRESSURE: 92 MMHG | HEIGHT: 62 IN | OXYGEN SATURATION: 98 % | WEIGHT: 168 LBS | TEMPERATURE: 98 F | SYSTOLIC BLOOD PRESSURE: 162 MMHG | HEART RATE: 92 BPM | BODY MASS INDEX: 30.91 KG/M2

## 2024-10-28 DIAGNOSIS — R55 SYNCOPE AND COLLAPSE: ICD-10-CM

## 2024-10-28 PROCEDURE — 99203 OFFICE O/P NEW LOW 30 MIN: CPT

## 2024-11-16 ENCOUNTER — RESULT REVIEW (OUTPATIENT)
Age: 59
End: 2024-11-16

## 2024-11-16 ENCOUNTER — OUTPATIENT (OUTPATIENT)
Dept: OUTPATIENT SERVICES | Facility: HOSPITAL | Age: 59
LOS: 1 days | End: 2024-11-16
Payer: COMMERCIAL

## 2024-11-16 DIAGNOSIS — Z00.8 ENCOUNTER FOR OTHER GENERAL EXAMINATION: ICD-10-CM

## 2024-11-16 DIAGNOSIS — R55 SYNCOPE AND COLLAPSE: ICD-10-CM

## 2024-11-16 PROCEDURE — 70547 MR ANGIOGRAPHY NECK W/O DYE: CPT

## 2024-11-16 PROCEDURE — 70547 MR ANGIOGRAPHY NECK W/O DYE: CPT | Mod: 26

## 2024-11-16 PROCEDURE — 70544 MR ANGIOGRAPHY HEAD W/O DYE: CPT

## 2024-11-16 PROCEDURE — 70551 MRI BRAIN STEM W/O DYE: CPT

## 2024-11-16 PROCEDURE — 70551 MRI BRAIN STEM W/O DYE: CPT | Mod: 26

## 2024-11-16 PROCEDURE — 70544 MR ANGIOGRAPHY HEAD W/O DYE: CPT | Mod: 26,59

## 2024-11-17 DIAGNOSIS — R55 SYNCOPE AND COLLAPSE: ICD-10-CM

## 2024-11-18 ENCOUNTER — APPOINTMENT (OUTPATIENT)
Dept: CARDIOLOGY | Facility: CLINIC | Age: 59
End: 2024-11-18
Payer: COMMERCIAL

## 2024-11-18 VITALS
DIASTOLIC BLOOD PRESSURE: 88 MMHG | HEART RATE: 91 BPM | HEIGHT: 62 IN | WEIGHT: 172 LBS | SYSTOLIC BLOOD PRESSURE: 158 MMHG | BODY MASS INDEX: 31.65 KG/M2 | OXYGEN SATURATION: 91 %

## 2024-11-18 DIAGNOSIS — F17.200 NICOTINE DEPENDENCE, UNSPECIFIED, UNCOMPLICATED: ICD-10-CM

## 2024-11-18 DIAGNOSIS — R55 SYNCOPE AND COLLAPSE: ICD-10-CM

## 2024-11-18 PROCEDURE — 93000 ELECTROCARDIOGRAM COMPLETE: CPT

## 2024-11-18 PROCEDURE — 99204 OFFICE O/P NEW MOD 45 MIN: CPT

## 2024-12-27 ENCOUNTER — OUTPATIENT (OUTPATIENT)
Dept: OUTPATIENT SERVICES | Facility: HOSPITAL | Age: 59
LOS: 1 days | End: 2024-12-27
Payer: COMMERCIAL

## 2024-12-27 ENCOUNTER — APPOINTMENT (OUTPATIENT)
Dept: SURGERY | Facility: CLINIC | Age: 59
End: 2024-12-27
Payer: COMMERCIAL

## 2024-12-27 ENCOUNTER — NON-APPOINTMENT (OUTPATIENT)
Age: 59
End: 2024-12-27

## 2024-12-27 ENCOUNTER — RESULT REVIEW (OUTPATIENT)
Age: 59
End: 2024-12-27

## 2024-12-27 VITALS
HEART RATE: 92 BPM | HEIGHT: 61 IN | WEIGHT: 173 LBS | SYSTOLIC BLOOD PRESSURE: 120 MMHG | OXYGEN SATURATION: 97 % | DIASTOLIC BLOOD PRESSURE: 84 MMHG | BODY MASS INDEX: 32.66 KG/M2

## 2024-12-27 DIAGNOSIS — Z00.8 ENCOUNTER FOR OTHER GENERAL EXAMINATION: ICD-10-CM

## 2024-12-27 DIAGNOSIS — R92.8 OTHER ABNORMAL AND INCONCLUSIVE FINDINGS ON DIAGNOSTIC IMAGING OF BREAST: ICD-10-CM

## 2024-12-27 DIAGNOSIS — E66.811 OBESITY, CLASS 1: ICD-10-CM

## 2024-12-27 DIAGNOSIS — E66.09 OBESITY, CLASS 1: ICD-10-CM

## 2024-12-27 PROCEDURE — 76642 ULTRASOUND BREAST LIMITED: CPT | Mod: 26,50

## 2024-12-27 PROCEDURE — G2211 COMPLEX E/M VISIT ADD ON: CPT

## 2024-12-27 PROCEDURE — 77066 DX MAMMO INCL CAD BI: CPT

## 2024-12-27 PROCEDURE — G0279: CPT

## 2024-12-27 PROCEDURE — G0279: CPT | Mod: 26

## 2024-12-27 PROCEDURE — 76642 ULTRASOUND BREAST LIMITED: CPT | Mod: 50

## 2024-12-27 PROCEDURE — 77066 DX MAMMO INCL CAD BI: CPT | Mod: 26

## 2024-12-27 PROCEDURE — 99213 OFFICE O/P EST LOW 20 MIN: CPT

## 2024-12-27 RX ORDER — SEMAGLUTIDE 0.25 MG/.5ML
0.25 INJECTION, SOLUTION SUBCUTANEOUS
Qty: 1 | Refills: 2 | Status: ACTIVE | COMMUNITY
Start: 2024-12-27 | End: 1900-01-01

## 2024-12-27 RX ADMIN — SEMAGLUTIDE 0.25 MG/0.5ML: 0.25 INJECTION, SOLUTION SUBCUTANEOUS at 00:00

## 2024-12-28 DIAGNOSIS — R92.8 OTHER ABNORMAL AND INCONCLUSIVE FINDINGS ON DIAGNOSTIC IMAGING OF BREAST: ICD-10-CM

## 2024-12-30 ENCOUNTER — TRANSCRIPTION ENCOUNTER (OUTPATIENT)
Age: 59
End: 2024-12-30

## 2024-12-30 ENCOUNTER — OUTPATIENT (OUTPATIENT)
Dept: OUTPATIENT SERVICES | Facility: HOSPITAL | Age: 59
LOS: 1 days | End: 2024-12-30

## 2024-12-30 ENCOUNTER — NON-APPOINTMENT (OUTPATIENT)
Age: 59
End: 2024-12-30

## 2024-12-30 ENCOUNTER — APPOINTMENT (OUTPATIENT)
Dept: INTERNAL MEDICINE | Facility: CLINIC | Age: 59
End: 2024-12-30

## 2024-12-31 DIAGNOSIS — E66.9 OBESITY, UNSPECIFIED: ICD-10-CM

## 2024-12-31 NOTE — ASU PATIENT PROFILE, ADULT - DOES PATIENT HAVE ADVANCE DIRECTIVE
CHIEF COMPLAINT  Back Pain (States that she has been having lower back pain with urinary frequency. States that the pain radiates to her pelvic area) and Abscess (States that she also has an abscess on the back of her left thigh.)      HISTORY OF PRESENT ILLNESS  Osito Appiah is a 39 y.o. female who  has a past medical history of Asthma, GERD (gastroesophageal reflux disease), and iron deficiency anemia. presents to the ED complaining of complaints. Patient's main complaint is right-sided flank pain/low back pain has been present over the past 3 days. Patient states she is also had some urinary frequency. Denies any actual dysuria. States that the pain radiates from her right flank into her pelvic region on the right side. Patient states she does have history of kidney stones in the past but does not have 1 and sometime and therefore thought maybe she was having a urinary tract infection. Denies any fevers or chills. Denies any vaginal discharge. No concern for STDs. States she did recently finish her menstrual cycle. Patient also complaining of abscess on the posterior aspect of her left thigh is been present over the past 3 days. Patient states the area is tender and she is concerned for recurrent abscess. States she has had an abscess in the same area twice before. Denies any recent antibiotic use. No trauma to the area. No other complaints, modifying factors or associated symptoms. Nursing notes reviewed.    Past Medical History:   Diagnosis Date    Asthma     GERD (gastroesophageal reflux disease)     iron deficiency anemia      Past Surgical History:   Procedure Laterality Date    ADENOIDECTOMY       Family History   Problem Relation Age of Onset    High Blood Pressure Mother     Sleep Apnea Mother     High Blood Pressure Sister      Social History     Socioeconomic History    Marital status: Single     Spouse name: Not on file    Number of children: Not on file    Years of education: Not on file    Highest education level: Not on file   Occupational History    Not on file   Tobacco Use    Smoking status: Never Smoker    Smokeless tobacco: Never Used   Vaping Use    Vaping Use: Never used   Substance and Sexual Activity    Alcohol use: Yes     Comment: once a year    Drug use: No    Sexual activity: Not Currently   Other Topics Concern    Not on file   Social History Narrative    Not on file     Social Determinants of Health     Financial Resource Strain:     Difficulty of Paying Living Expenses: Not on file   Food Insecurity:     Worried About Running Out of Food in the Last Year: Not on file    Pancho of Food in the Last Year: Not on file   Transportation Needs:     Lack of Transportation (Medical): Not on file    Lack of Transportation (Non-Medical): Not on file   Physical Activity:     Days of Exercise per Week: Not on file    Minutes of Exercise per Session: Not on file   Stress:     Feeling of Stress : Not on file   Social Connections:     Frequency of Communication with Friends and Family: Not on file    Frequency of Social Gatherings with Friends and Family: Not on file    Attends Mandaeism Services: Not on file    Active Member of [address] [place] [address] or Organizations: Not on file    Attends Club or Organization Meetings: Not on file    Marital Status: Not on file   Intimate Partner Violence:     Fear of Current or Ex-Partner: Not on file    Emotionally Abused: Not on file    Physically Abused: Not on file    Sexually Abused: Not on file   Housing Stability:     Unable to Pay for Housing in the Last Year: Not on file    Number of [place] in the Last Year: Not on file    Unstable Housing in the Last Year: Not on file     No current facility-administered medications for this encounter.      Current Outpatient Medications   Medication Sig Dispense Refill    cephALEXin (KEFLEX) 500 MG capsule Take 1 capsule by mouth 4 times daily for 7 days 28 capsule 0    sulfamethoxazole-trimethoprim (BACTRIM DS) 800-160 MG per tablet Take 1 tablet by mouth 2 times daily for 7 days 14 tablet 0    tamsulosin (FLOMAX) 0.4 MG capsule Take 1 capsule by mouth daily for 5 doses 5 capsule 0    albuterol sulfate HFA (VENTOLIN HFA) 108 (90 Base) MCG/ACT inhaler Inhale 2 puffs into the lungs 4 times daily as needed for Wheezing 18 g 0     No Known Allergies      REVIEW OF SYSTEMS  10 systems reviewed, pertinent positives per HPI otherwise noted to be negative    PHYSICAL EXAM  BP (!) 133/96   Pulse 109   Temp 98.3 °F (36.8 °C) (Oral)   Resp 20   SpO2 99%      CONSTITUTIONAL: AOx4, cooperative with exam, afebrile   HEAD: normocephalic, atraumatic   EYES: PERRL, EOMI, anicteric sclera   ENT: Moist mucous membranes, uvula midline   BACK: Symmetric, no deformity, right CVA tenderness, no left CVA tenderness, no midline tenderness of the cervical or thoracic or lumbar spine   LUNGS: Bilateral breath sounds, CTAB, no rales/ronchi/wheezes   CARDIOVASCULAR: RRR, normal S1/S2, no m/r/g, 2+ pulses throughout   ABDOMEN: Soft, non-tender, non-distended, +BS   NEUROLOGIC:  MAEx4, GCS 15   MUSCULOSKELETAL: No clubbing, cyanosis or edema   SKIN: No rash, 2 cm x 2 cm area of fluctuance, tenderness and erythema on the posterior aspect of the left thigh in the midline, no active drainage         RADIOLOGY  X-RAYS:  I have reviewed radiologic plain film image(s). ALL OTHER NON-PLAIN FILM IMAGES SUCH AS CT, ULTRASOUND AND MRI HAVE BEEN READ BY THE RADIOLOGIST. CT ABDOMEN PELVIS WO CONTRAST Additional Contrast? None   Final Result   Minimal prominence of the right renal collecting system. 2 mm stone near the   right UVJ is noted, not clearly within the ureter. Alternatively this could   represent a stone recently passed in the bladder or adjacent phlebolith.                 EKG INTERPRETATION  None    PROCEDURES  Incision/Drainage    Date/Time: 3/24/2022 3:48 AM  Performed by: Rossy Hernández MD  Authorized by: Greg Wadsworth MD     Consent:     Consent obtained:  Verbal    Consent given by:  Patient    Risks discussed:  Damage to other organs, bleeding, incomplete drainage, infection and pain    Alternatives discussed:  No treatment, observation and delayed treatment  Location:     Type:  Abscess    Size:  2 x 2 cm    Location:  Lower extremity    Lower extremity location:  Leg    Leg location:  L upper leg (posterior thigh)  Pre-procedure details:     Skin preparation:  Antiseptic wash  Anesthesia (see MAR for exact dosages): Anesthesia method:  Local infiltration    Local anesthetic:  Lidocaine 1% w/o epi  Procedure type:     Complexity:  Simple  Procedure details:     Needle aspiration: no      Incision types:  Stab incision    Incision depth:  Dermal    Scalpel blade:  11    Wound management:  Probed and deloculated and irrigated with saline    Drainage:  Bloody and purulent    Drainage amount: Moderate    Wound treatment:  Wound left open    Packing materials:  None  Post-procedure details:     Patient tolerance of procedure: Tolerated well, no immediate complications          ED COURSE/MDM  UTI, pyelonephritis, nephrolithiasis, hydronephrosis  Abscess, cyst  Patient seen and evaluated. History and physical as above. Nontoxic, afebrile. Patient presents complaining of abscess to left thigh which is drained. Please see procedure note. Patient also having some right-sided flank pain and urinary frequency. Will obtain urinalysis, urine pregnancy and possible CT abdomen pelvis. ED Course as of 03/24/22 0429   Thu Mar 24, 2022   0350 Patient's urinalysis with trace leukocyte esterase and 6-9 WBCs. Has 21-50 RBCs more concerning for kidney stone. Does have large blood on microscopic urinalysis. Negative pregnancy. Patient updated on results. Will obtain plain CT to evaluate for possible obstructing stone.  [DS]   4920 Patient CT abdomen pelvis does show 2 mm distal right sided UVJ stone that may have already entered the bladder. Patient updated on results. Patient is already on antibiotics for her abscess and therefore this will cover for any slight UTI as well. With patient tolerating oral intake being nontoxic and passing urine, I feel she is appropriate for discharge with outpatient follow-up. Will discharge with short course of Flomax as well. Discussed in well-hydrated. All questions answered prior to discharge. [DS]      ED Course User Index  [DS] Jayshree Atkinson MD     I estimate there is LOW risk for ACUTE APPENDICITIS, BOWEL OBSTRUCTION, CHOLECYSTITIS, DIVERTICULITIS, INCARCERATED HERNIA, PANCREATITIS, PELVIC INFLAMMATORY DISEASE, PERFORATED BOWEL or ULCER, PREGNANCY, or TUBO-OVARIAN ABSCESS, thus I consider the discharge disposition reasonable. Also, there is no evidence or peritonitis, sepsis, or toxicity. Adams Dobbs and I have discussed the diagnosis and risks, and we agree with discharging home to follow-up with their primary doctor. We also discussed returning to the Emergency Department immediately if new or worsening symptoms occur. We have discussed the symptoms which are most concerning (e.g., bloody stool, fever, changing or worsening pain, vomiting) that necessitate immediate return. Patient was given scripts for the following medications. I counseled patient how to take these medications. New Prescriptions    CEPHALEXIN (KEFLEX) 500 MG CAPSULE    Take 1 capsule by mouth 4 times daily for 7 days    SULFAMETHOXAZOLE-TRIMETHOPRIM (BACTRIM DS) 800-160 MG PER TABLET    Take 1 tablet by mouth 2 times daily for 7 days    TAMSULOSIN (FLOMAX) 0.4 MG CAPSULE    Take 1 capsule by mouth daily for 5 doses           CLINICAL IMPRESSION  1. Abscess of left thigh    2. Right distal ureteral calculus        Blood pressure (!) 133/96, pulse 109, temperature 98.3 °F (36.8 °C), temperature source Oral, resp. rate 20, SpO2 99 %, not currently breastfeeding.     DISPOSITION  Patient was This patient was documented to have renal cell carcinoma, your clarification is needed regarding the laterality:    Discharge Note Provider 12-23-24 @ 15:01  78 yo female h/o  metastatic renal cell carcinoma to liver, lungs, liver abscesses    # RCC/Hepatic and Pulmonary  mets  - continue with oncology follow up    CT Abdomen and Pelvis w/ IV Cont (12.15.24 @ 01:25  KIDNEYS/URETERS: Left renal cyst. No hydronephrosis.    Can the laterality of the RCC be specified?  -RCC affecting the Left kidney  -RCC affecting the Left and Right kidney.  -RCC affecting both kidneys.  -Other, please specify: discharged to home in good condition. [address] [address]    Call today  For a follow up appointment. Disclaimer: All medical record entries made by [employer] dictation.       (Please note that this note was completed with a voice recognition program. Every attempt was made to edit the dictations, but inevitably there remain words that are mis-transcribed.)            Danny Gerber MD  03/24/22 9059 No

## 2025-01-22 ENCOUNTER — APPOINTMENT (OUTPATIENT)
Dept: CV DIAGNOSITCS | Facility: HOSPITAL | Age: 60
End: 2025-01-22

## 2025-01-22 ENCOUNTER — APPOINTMENT (OUTPATIENT)
Dept: CV DIAGNOSTICS | Facility: HOSPITAL | Age: 60
End: 2025-01-22

## 2025-01-22 ENCOUNTER — OUTPATIENT (OUTPATIENT)
Dept: OUTPATIENT SERVICES | Facility: HOSPITAL | Age: 60
LOS: 1 days | End: 2025-01-22
Payer: COMMERCIAL

## 2025-01-22 ENCOUNTER — RESULT REVIEW (OUTPATIENT)
Age: 60
End: 2025-01-22

## 2025-01-22 DIAGNOSIS — R55 SYNCOPE AND COLLAPSE: ICD-10-CM

## 2025-01-22 PROCEDURE — 93307 TTE W/O DOPPLER COMPLETE: CPT | Mod: 26

## 2025-01-22 PROCEDURE — 93307 TTE W/O DOPPLER COMPLETE: CPT

## 2025-01-23 DIAGNOSIS — R55 SYNCOPE AND COLLAPSE: ICD-10-CM

## 2025-01-23 NOTE — ASU PREOP CHECKLIST - HEIGHT IN FEET
Chronic, not at goal (unstable),     Orders:    Semaglutide-Weight Management (WEGOVY) 0.25 MG/0.5ML SOAJ SC injection; Inject 0.25 mg into the skin every 7 days     5

## 2025-01-27 ENCOUNTER — APPOINTMENT (OUTPATIENT)
Dept: OBGYN | Facility: CLINIC | Age: 60
End: 2025-01-27
Payer: COMMERCIAL

## 2025-01-27 ENCOUNTER — LABORATORY RESULT (OUTPATIENT)
Age: 60
End: 2025-01-27

## 2025-01-27 VITALS
DIASTOLIC BLOOD PRESSURE: 84 MMHG | HEIGHT: 61 IN | WEIGHT: 169 LBS | SYSTOLIC BLOOD PRESSURE: 126 MMHG | BODY MASS INDEX: 31.91 KG/M2

## 2025-01-27 DIAGNOSIS — Z01.419 ENCOUNTER FOR GYNECOLOGICAL EXAMINATION (GENERAL) (ROUTINE) W/OUT ABNORMAL FINDINGS: ICD-10-CM

## 2025-01-27 PROCEDURE — 99396 PREV VISIT EST AGE 40-64: CPT | Mod: 25

## 2025-01-27 PROCEDURE — 99459 PELVIC EXAMINATION: CPT

## 2025-01-29 RX ORDER — SEMAGLUTIDE 0.5 MG/.5ML
0.5 INJECTION, SOLUTION SUBCUTANEOUS
Qty: 1 | Refills: 3 | Status: ACTIVE | COMMUNITY
Start: 2025-01-29 | End: 1900-01-01

## 2025-02-11 ENCOUNTER — TRANSCRIPTION ENCOUNTER (OUTPATIENT)
Age: 60
End: 2025-02-11

## 2025-02-12 ENCOUNTER — APPOINTMENT (OUTPATIENT)
Dept: SURGERY | Facility: CLINIC | Age: 60
End: 2025-02-12
Payer: COMMERCIAL

## 2025-02-12 VITALS
HEART RATE: 70 BPM | OXYGEN SATURATION: 98 % | WEIGHT: 160 LBS | SYSTOLIC BLOOD PRESSURE: 128 MMHG | DIASTOLIC BLOOD PRESSURE: 84 MMHG | BODY MASS INDEX: 25.11 KG/M2 | HEIGHT: 67 IN | TEMPERATURE: 97 F

## 2025-02-12 PROCEDURE — 99213 OFFICE O/P EST LOW 20 MIN: CPT

## 2025-02-12 RX ORDER — TIRZEPATIDE 7.5 MG/.5ML
7.5 INJECTION, SOLUTION SUBCUTANEOUS
Qty: 1 | Refills: 3 | Status: ACTIVE | COMMUNITY
Start: 2025-02-12 | End: 1900-01-01

## 2025-02-14 ENCOUNTER — APPOINTMENT (OUTPATIENT)
Dept: SURGERY | Facility: CLINIC | Age: 60
End: 2025-02-14

## 2025-02-21 RX ORDER — TIRZEPATIDE 7.5 MG/.5ML
7.5 INJECTION, SOLUTION SUBCUTANEOUS
Qty: 1 | Refills: 3 | Status: ACTIVE | COMMUNITY
Start: 2025-02-21 | End: 1900-01-01

## 2025-03-03 ENCOUNTER — NON-APPOINTMENT (OUTPATIENT)
Age: 60
End: 2025-03-03

## 2025-03-17 ENCOUNTER — APPOINTMENT (OUTPATIENT)
Dept: SURGERY | Facility: CLINIC | Age: 60
End: 2025-03-17

## 2025-04-10 ENCOUNTER — APPOINTMENT (OUTPATIENT)
Dept: SURGERY | Facility: CLINIC | Age: 60
End: 2025-04-10

## 2025-04-23 NOTE — ASU DISCHARGE PLAN (ADULT/PEDIATRIC) - CARE COORDINATION DISCHARGE PLANNING
Linda Romero (:  1959) is a 66 y.o. female,Established patient, here for evaluation of the following chief complaint(s):  Thyroid Problem (Check thyroid; blood work. Patient states she has been having symptoms; receding hairline, dry skin, fatigue, stomach area weight gain)          Diagnosis Orders   1. Acquired hypothyroidism  T4, Free    TSH      2. Abnormal glucose  Hemoglobin A1C         Levothyroxine  Colon, bone density, mammogram  Assessment & Plan  1. Hypothyroidism.  - TSH level was slightly elevated at 4.3, prompting an increase in medication dosage to 50 mcg.  - Lab order placed to reassess thyroid function.  - Discussion about the slight elevation and the rationale for treatment despite minimal deviation from normal.  - Advised to continue current regimen of 50 mcg unless evidence of overcorrection is found.    2. Urinary incontinence.  - Symptoms suggest bladder strain rather than renal issues, with normal kidney function.  - Physical exam and history indicate pelvic floor involvement.  - Recommended Kegel exercises to strengthen the pelvic floor.  - Referral to a urologist will be considered if Kegel exercises prove ineffective.    3. Neck swelling.  - Persistent swelling in the neck, potentially due to arthritis.  - Patient has been performing exercises and using an additional pillow, which have helped reduce the swelling.  - Discussion about muscle hypertrophy and the benefits of flexibility and range of motion exercises.  - Continued monitoring of symptoms and effectiveness of current interventions.    4. Health maintenance.  - Colonoscopy scheduled for 2025.  - Mammogram completed through Dr. Hernandez at [facility]; results not yet available in the current records.  - Bone density test was ordered but not yet completed.  - Emphasis on the importance of completing the bone density test and follow-up on mammogram results.          Subjective   HPI  History of 
No

## 2025-05-19 ENCOUNTER — APPOINTMENT (OUTPATIENT)
Dept: CARDIOLOGY | Facility: CLINIC | Age: 60
End: 2025-05-19